# Patient Record
Sex: FEMALE | Race: WHITE | Employment: FULL TIME | URBAN - METROPOLITAN AREA
[De-identification: names, ages, dates, MRNs, and addresses within clinical notes are randomized per-mention and may not be internally consistent; named-entity substitution may affect disease eponyms.]

---

## 2017-03-07 PROBLEM — J30.1 SEASONAL ALLERGIC RHINITIS DUE TO POLLEN: Status: ACTIVE | Noted: 2017-03-07

## 2017-03-07 PROBLEM — J01.01 ACUTE RECURRENT MAXILLARY SINUSITIS: Status: ACTIVE | Noted: 2017-03-07

## 2017-07-19 PROBLEM — J01.01 ACUTE RECURRENT MAXILLARY SINUSITIS: Status: RESOLVED | Noted: 2017-03-07 | Resolved: 2017-07-19

## 2017-07-19 PROBLEM — K82.9 GALL BLADDER DISEASE: Status: ACTIVE | Noted: 2017-07-19

## 2017-10-21 ENCOUNTER — HOSPITAL ENCOUNTER (OUTPATIENT)
Dept: MAMMOGRAPHY | Age: 42
Discharge: HOME | End: 2017-10-21
Attending: INTERNAL MEDICINE
Payer: COMMERCIAL

## 2017-10-21 DIAGNOSIS — Z12.31 VISIT FOR SCREENING MAMMOGRAM: ICD-10-CM

## 2017-10-21 PROCEDURE — 77067 SCR MAMMO BI INCL CAD: CPT

## 2017-10-25 PROBLEM — H65.02 ACUTE SEROUS OTITIS MEDIA OF LEFT EAR: Status: ACTIVE | Noted: 2017-10-25

## 2019-03-12 ENCOUNTER — HOSPITAL ENCOUNTER (OUTPATIENT)
Dept: MAMMOGRAPHY | Age: 44
Discharge: HOME | End: 2019-03-12
Attending: OBSTETRICS & GYNECOLOGY
Payer: COMMERCIAL

## 2019-03-12 DIAGNOSIS — N63.20 LEFT BREAST MASS: ICD-10-CM

## 2019-03-12 PROCEDURE — 77066 DX MAMMO INCL CAD BI: CPT

## 2019-03-12 PROCEDURE — 76642 ULTRASOUND BREAST LIMITED: CPT

## 2019-03-17 NOTE — PROGRESS NOTES
Olivia Crowder I saw that the mammogram and ultrasound didn't show anything worrisome, just dense tissue. So I'll just recheck that area at our follow up visit in a few months.

## 2019-08-27 PROBLEM — Z83.49 FAMILY HISTORY OF THYROID DISEASE: Status: ACTIVE | Noted: 2019-08-27

## 2019-08-27 PROBLEM — L70.8 OTHER ACNE: Status: ACTIVE | Noted: 2019-08-27

## 2020-08-15 ENCOUNTER — HOSPITAL ENCOUNTER (OUTPATIENT)
Dept: MAMMOGRAPHY | Age: 45
Discharge: HOME | End: 2020-08-15
Attending: OBSTETRICS & GYNECOLOGY
Payer: COMMERCIAL

## 2020-08-15 DIAGNOSIS — Z12.31 SCREENING MAMMOGRAM FOR HIGH-RISK PATIENT: ICD-10-CM

## 2020-08-15 PROCEDURE — 77063 BREAST TOMOSYNTHESIS BI: CPT

## 2020-09-25 PROBLEM — J40 BRONCHITIS: Status: ACTIVE | Noted: 2020-09-25

## 2020-09-25 PROBLEM — Z87.09 HISTORY OF PARANASAL SINUS PAIN: Status: ACTIVE | Noted: 2020-09-25

## 2020-09-25 PROBLEM — J01.01 ACUTE RECURRENT MAXILLARY SINUSITIS: Status: ACTIVE | Noted: 2020-09-25

## 2022-03-18 PROBLEM — Z87.09 HISTORY OF PARANASAL SINUS PAIN: Status: ACTIVE | Noted: 2020-09-25

## 2022-03-18 PROBLEM — J30.1 SEASONAL ALLERGIC RHINITIS DUE TO POLLEN: Status: ACTIVE | Noted: 2017-03-07

## 2022-03-19 PROBLEM — L70.8 OTHER ACNE: Status: ACTIVE | Noted: 2019-08-27

## 2022-03-19 PROBLEM — K82.9 GALL BLADDER DISEASE: Status: ACTIVE | Noted: 2017-07-19

## 2022-03-19 PROBLEM — Z83.49 FAMILY HISTORY OF THYROID DISEASE: Status: ACTIVE | Noted: 2019-08-27

## 2022-03-19 PROBLEM — J40 BRONCHITIS: Status: ACTIVE | Noted: 2020-09-25

## 2022-03-20 PROBLEM — J01.01 ACUTE RECURRENT MAXILLARY SINUSITIS: Status: ACTIVE | Noted: 2020-09-25

## 2022-05-02 PROBLEM — Z52.4 KIDNEY DONOR: Status: ACTIVE | Noted: 2022-05-02

## 2022-05-23 ENCOUNTER — TELEPHONE (OUTPATIENT)
Dept: INTERNAL MEDICINE CLINIC | Facility: CLINIC | Age: 47
End: 2022-05-23

## 2022-05-23 DIAGNOSIS — J40 BRONCHITIS: Primary | ICD-10-CM

## 2022-05-23 RX ORDER — PREDNISONE 5 MG/1
TABLET ORAL
Qty: 1 EACH | Refills: 0 | Status: SHIPPED | OUTPATIENT
Start: 2022-05-23 | End: 2022-05-31 | Stop reason: ALTCHOICE

## 2022-05-23 RX ORDER — AZITHROMYCIN 250 MG/1
250 TABLET, FILM COATED ORAL SEE ADMIN INSTRUCTIONS
Qty: 6 TABLET | Refills: 0 | Status: SHIPPED | OUTPATIENT
Start: 2022-05-23 | End: 2022-05-28

## 2022-05-31 ENCOUNTER — OFFICE VISIT (OUTPATIENT)
Dept: INTERNAL MEDICINE CLINIC | Facility: CLINIC | Age: 47
End: 2022-05-31
Payer: COMMERCIAL

## 2022-05-31 VITALS
DIASTOLIC BLOOD PRESSURE: 82 MMHG | HEIGHT: 63 IN | BODY MASS INDEX: 26.58 KG/M2 | WEIGHT: 150 LBS | HEART RATE: 80 BPM | SYSTOLIC BLOOD PRESSURE: 128 MMHG

## 2022-05-31 DIAGNOSIS — F43.21 GRIEF: ICD-10-CM

## 2022-05-31 DIAGNOSIS — I10 HYPERTENSION, UNSPECIFIED TYPE: Primary | ICD-10-CM

## 2022-05-31 PROBLEM — Z52.4 KIDNEY DONOR: Status: RESOLVED | Noted: 2022-05-02 | Resolved: 2022-05-31

## 2022-05-31 PROCEDURE — 99213 OFFICE O/P EST LOW 20 MIN: CPT | Performed by: INTERNAL MEDICINE

## 2022-05-31 ASSESSMENT — PATIENT HEALTH QUESTIONNAIRE - PHQ9
SUM OF ALL RESPONSES TO PHQ9 QUESTIONS 1 & 2: 0
SUM OF ALL RESPONSES TO PHQ QUESTIONS 1-9: 0
1. LITTLE INTEREST OR PLEASURE IN DOING THINGS: 0
SUM OF ALL RESPONSES TO PHQ QUESTIONS 1-9: 0
2. FEELING DOWN, DEPRESSED OR HOPELESS: 0

## 2022-05-31 NOTE — PROGRESS NOTES
HPI    Her blood pressure was too high for kidney donation , diastolic . She brings in a long list of blood pressures that are all high    Past Medical History, Past Surgical History, Family history, Social History, and Medications were all reviewed with the patient today and updated as necessary. Current Outpatient Medications   Medication Sig Dispense Refill    fluticasone (FLONASE) 50 MCG/ACT nasal spray 2 sprays by Nasal route daily      levocetirizine (XYZAL) 5 MG tablet Take 5 mg by mouth daily      montelukast (SINGULAIR) 10 MG tablet Take 10 mg by mouth daily      predniSONE 5 MG (21) TBPK Take as directed 1 each 0     No current facility-administered medications for this visit. Allergies   Allergen Reactions    Drospirenone-Ethinyl Estradiol Other (See Comments)     Dry skin and irritable     Patient Active Problem List   Diagnosis    History of paranasal sinus pain    Seasonal allergic rhinitis due to pollen    Pure hypercholesterolemia    Other acne    Family history of thyroid disease    Bronchitis    Gall bladder disease    Other seasonal allergic rhinitis    Acute recurrent maxillary sinusitis    Sinus congestion    Hypertension    Grief         Review of Systems   Cardiovascular: Negative for palpitations and leg swelling. OBJECTIVE:  /82   Pulse 80   Ht 5' 2.5\" (1.588 m)   Wt 150 lb (68 kg)   BMI 27.00 kg/m²      Physical Exam  Constitutional:       Appearance: Normal appearance. Neurological:      Mental Status: She is alert. Psychiatric:         Mood and Affect: Mood normal.         Behavior: Behavior normal.          Medical problems and test results were reviewed with the patient today. No results found for this or any previous visit (from the past 672 hour(s)). ASSESSMENT and PLAN    Reza Reed was seen today for blood pressure check.     Diagnoses and all orders for this visit:    Hypertension, unspecified type    Grief    She will watch salt, increase aerobic exercise and work in losing weight she gained after her father . Return in about 4 months (around 2022).     Will make a decision about treating her blood pressure on return

## 2022-06-08 ENCOUNTER — TELEPHONE (OUTPATIENT)
Dept: INTERNAL MEDICINE CLINIC | Facility: CLINIC | Age: 47
End: 2022-06-08

## 2022-06-08 DIAGNOSIS — I10 PRIMARY HYPERTENSION: Primary | ICD-10-CM

## 2022-06-08 RX ORDER — TRIAMTERENE AND HYDROCHLOROTHIAZIDE 37.5; 25 MG/1; MG/1
0.5 TABLET ORAL DAILY
Qty: 45 TABLET | Refills: 2 | Status: SHIPPED | OUTPATIENT
Start: 2022-06-08 | End: 2022-10-31 | Stop reason: ALTCHOICE

## 2022-06-08 NOTE — TELEPHONE ENCOUNTER
Pt called to ask to start the blood pressure medication that was discussed at her last appointment, she no longer wants to wait the 4 months. Please call into [facility] on [address].

## 2022-06-08 NOTE — TELEPHONE ENCOUNTER
The patient called to check the status of her medication request.  Please call her back as your early convenience.

## 2022-06-09 NOTE — TELEPHONE ENCOUNTER
Spoke with pt and per Dr. Vielka Price agreeable to take 0.5 tabs daily and continue to monitor home bp. Scheduled pt for 8 week f/u on bp in office.

## 2022-06-24 ENCOUNTER — TELEMEDICINE (OUTPATIENT)
Dept: INTERNAL MEDICINE CLINIC | Facility: CLINIC | Age: 47
End: 2022-06-24
Payer: COMMERCIAL

## 2022-06-24 DIAGNOSIS — R05.9 COUGH: Primary | ICD-10-CM

## 2022-06-24 PROCEDURE — 99213 OFFICE O/P EST LOW 20 MIN: CPT | Performed by: PHYSICIAN ASSISTANT

## 2022-06-24 RX ORDER — PREDNISONE 5 MG/1
TABLET ORAL
Qty: 1 EACH | Refills: 0 | Status: SHIPPED | OUTPATIENT
Start: 2022-06-24 | End: 2022-08-19

## 2022-06-24 RX ORDER — AZITHROMYCIN 250 MG/1
250 TABLET, FILM COATED ORAL SEE ADMIN INSTRUCTIONS
Qty: 6 TABLET | Refills: 0 | Status: SHIPPED | OUTPATIENT
Start: 2022-06-24 | End: 2022-06-29

## 2022-06-24 RX ORDER — BENZONATATE 200 MG/1
200 CAPSULE ORAL 3 TIMES DAILY PRN
Qty: 30 CAPSULE | Refills: 0 | Status: SHIPPED | OUTPATIENT
Start: 2022-06-24 | End: 2022-07-01

## 2022-06-24 ASSESSMENT — ENCOUNTER SYMPTOMS
SINUS PAIN: 1
WHEEZING: 0
SHORTNESS OF BREATH: 0
COUGH: 1
CONSTIPATION: 0
VOMITING: 0
SINUS PRESSURE: 1
EYE PAIN: 0
VOICE CHANGE: 0
SORE THROAT: 0
CHEST TIGHTNESS: 0
NAUSEA: 0
COLOR CHANGE: 0
ABDOMINAL PAIN: 0
DIARRHEA: 0

## 2022-06-24 NOTE — PROGRESS NOTES
PROGRESS NOTE    Meme Pinzon is a 51 y.o. female seen for a follow up visit regarding   Chief Complaint   Patient presents with    Cough    Congestion        HPI  Cough  This is a new problem. The current episode started in the past 7 days. The problem has been gradually worsening. The cough is non-productive. Pertinent negatives include no chest pain, ear pain, fever, headaches, rash, sore throat, shortness of breath or wheezing. The symptoms are aggravated by pollens. She has tried nothing for the symptoms. Past Medical History, Past Surgical History, Family history, Social History, and Medications were all reviewed with the patient today and updated as necessary. Current Outpatient Medications   Medication Sig Dispense Refill    azithromycin (ZITHROMAX) 250 MG tablet Take 1 tablet by mouth See Admin Instructions for 5 days 500mg on day 1 followed by 250mg on days 2 - 5 6 tablet 0    benzonatate (TESSALON) 200 MG capsule Take 1 capsule by mouth 3 times daily as needed for Cough 30 capsule 0    predniSONE 5 MG (21) TBPK See administration instruction per 5 mg dose pac 1 each 0    triamterene-hydroCHLOROthiazide (MAXZIDE-25) 37.5-25 MG per tablet Take 0.5 tablets by mouth daily 45 tablet 2    fluticasone (FLONASE) 50 MCG/ACT nasal spray 2 sprays by Nasal route daily      levocetirizine (XYZAL) 5 MG tablet Take 5 mg by mouth daily      montelukast (SINGULAIR) 10 MG tablet Take 10 mg by mouth daily       No current facility-administered medications for this visit. Allergies   Allergen Reactions    Drospirenone-Ethinyl Estradiol Other (See Comments)     Dry skin and irritable         ROS  Review of Systems   Constitutional: Negative for fatigue, fever and unexpected weight change. HENT: Positive for congestion, sinus pressure and sinus pain. Negative for ear pain, hearing loss, sore throat, tinnitus and voice change. Eyes: Negative for pain and visual disturbance.    Respiratory: Positive for cough. Negative for chest tightness, shortness of breath and wheezing. Cardiovascular: Negative for chest pain, palpitations and leg swelling. Gastrointestinal: Negative for abdominal pain, constipation, diarrhea, nausea and vomiting. Genitourinary: Negative for dysuria, frequency, hematuria and urgency. Musculoskeletal: Negative for arthralgias, gait problem, joint swelling and neck pain. Skin: Negative for color change and rash. Neurological: Negative for dizziness, syncope, numbness and headaches. Hematological: Negative for adenopathy. Psychiatric/Behavioral: Negative for decreased concentration, hallucinations, sleep disturbance and suicidal ideas. The patient is not nervous/anxious. OBJECTIVE:  There were no vitals taken for this visit. PHYSICAL EXAM  Physical Exam  Constitutional:       Appearance: Normal appearance. HENT:      Head: Normocephalic and atraumatic. Nose: Nose normal.   Eyes:      Extraocular Movements: Extraocular movements intact. Pulmonary:      Effort: Pulmonary effort is normal.   Musculoskeletal:         General: Normal range of motion. Cervical back: Normal range of motion. Skin:     Findings: No erythema or rash. Neurological:      General: No focal deficit present. Mental Status: She is alert and oriented to person, place, and time. Psychiatric:         Mood and Affect: Mood normal.         Medical problems and test results were reviewed with the patient today. ASSESSMENT and PLAN  Steven Waldrop was seen today for cough and congestion. Diagnoses and all orders for this visit:    Cough  -     azithromycin (ZITHROMAX) 250 MG tablet; Take 1 tablet by mouth See Admin Instructions for 5 days 500mg on day 1 followed by 250mg on days 2 - 5  -     benzonatate (TESSALON) 200 MG capsule;  Take 1 capsule by mouth 3 times daily as needed for Cough  -     predniSONE 5 MG (21) TBPK; See administration instruction per 5 mg dose pac          Return for Please keep previous appt as scheduled. 6/24/2022        I was in office while conducting this encounter. Consent:  She and/or her healthcare decision maker is aware that this patient-initiated Telehealth encounter is a billable service, with coverage as determined by her insurance carrier. She is aware that she may receive a bill and has provided verbal consent to proceed: Yes    This virtual visit was conducted doxy. me with audio and visual components

## 2022-08-18 PROBLEM — Z52.4 KIDNEY DONOR: Status: ACTIVE | Noted: 2022-05-02

## 2022-08-18 PROBLEM — J30.1 NON-SEASONAL ALLERGIC RHINITIS DUE TO POLLEN: Status: ACTIVE | Noted: 2017-03-07

## 2022-08-19 ENCOUNTER — OFFICE VISIT (OUTPATIENT)
Dept: INTERNAL MEDICINE CLINIC | Facility: CLINIC | Age: 47
End: 2022-08-19
Payer: COMMERCIAL

## 2022-08-19 VITALS
DIASTOLIC BLOOD PRESSURE: 82 MMHG | HEIGHT: 63 IN | WEIGHT: 152 LBS | BODY MASS INDEX: 26.93 KG/M2 | SYSTOLIC BLOOD PRESSURE: 124 MMHG

## 2022-08-19 DIAGNOSIS — J30.1 NON-SEASONAL ALLERGIC RHINITIS DUE TO POLLEN: ICD-10-CM

## 2022-08-19 DIAGNOSIS — I10 PRIMARY HYPERTENSION: Primary | ICD-10-CM

## 2022-08-19 DIAGNOSIS — E78.00 PURE HYPERCHOLESTEROLEMIA, UNSPECIFIED: ICD-10-CM

## 2022-08-19 PROCEDURE — 99213 OFFICE O/P EST LOW 20 MIN: CPT | Performed by: INTERNAL MEDICINE

## 2022-08-19 ASSESSMENT — PATIENT HEALTH QUESTIONNAIRE - PHQ9
SUM OF ALL RESPONSES TO PHQ QUESTIONS 1-9: 0
1. LITTLE INTEREST OR PLEASURE IN DOING THINGS: 0
SUM OF ALL RESPONSES TO PHQ QUESTIONS 1-9: 0
SUM OF ALL RESPONSES TO PHQ QUESTIONS 1-9: 0
SUM OF ALL RESPONSES TO PHQ9 QUESTIONS 1 & 2: 0
SUM OF ALL RESPONSES TO PHQ QUESTIONS 1-9: 0
2. FEELING DOWN, DEPRESSED OR HOPELESS: 0

## 2022-08-19 NOTE — PROGRESS NOTES
She could not donate her kidney and was found to have hypertension. Her sister donated her kidney. She has been on current medicine for 2 months now      Past Medical History, Past Surgical History, Family history, Social History, and Medications were all reviewed with the patient today and updated as necessary. Current Outpatient Medications   Medication Sig Dispense Refill    triamterene-hydroCHLOROthiazide (MAXZIDE-25) 37.5-25 MG per tablet Take 0.5 tablets by mouth daily 45 tablet 2    fluticasone (FLONASE) 50 MCG/ACT nasal spray 2 sprays by Nasal route daily as needed      levocetirizine (XYZAL) 5 MG tablet Take 5 mg by mouth daily      montelukast (SINGULAIR) 10 MG tablet Take 10 mg by mouth daily       No current facility-administered medications for this visit. Allergies   Allergen Reactions    Drospirenone-Ethinyl Estradiol Other (See Comments)     Dry skin and irritable     Patient Active Problem List   Diagnosis    History of paranasal sinus pain    Non-seasonal allergic rhinitis due to pollen    Pure hypercholesterolemia, unspecified    Other acne    Family history of thyroid disease    Bronchitis    Gall bladder disease    Other seasonal allergic rhinitis    Acute recurrent maxillary sinusitis    Sinus congestion    Kidney donor    Hypertension    Grief         Review of Systems   Constitutional:  Negative for appetite change. OBJECTIVE:  /82   Ht 5' 2.5\" (1.588 m)   Wt 152 lb (68.9 kg)   BMI 27.36 kg/m²      Physical Exam     Medical problems and test results were reviewed with the patient today. No results found for this or any previous visit (from the past 672 hour(s)). ASSESSMENT and PLAN    1. Primary hypertension  -     Basic Metabolic Panel; Future  2. Pure hypercholesterolemia, unspecified  3.  Non-seasonal allergic rhinitis due to pollen       BP good at home and she has been monitoring, she will bring in her cuff next time    No follow-ups on file.

## 2022-11-07 DIAGNOSIS — J30.2 OTHER SEASONAL ALLERGIC RHINITIS: Primary | ICD-10-CM

## 2022-11-07 RX ORDER — MONTELUKAST SODIUM 10 MG/1
10 TABLET ORAL DAILY
Qty: 90 TABLET | Refills: 5 | Status: SHIPPED | OUTPATIENT
Start: 2022-11-07 | End: 2023-02-05

## 2023-01-11 PROBLEM — R92.30 BREAST DENSITY: Status: ACTIVE | Noted: 2023-01-11

## 2023-01-11 PROBLEM — R92.2 BREAST DENSITY: Status: ACTIVE | Noted: 2023-01-11

## 2023-01-12 ENCOUNTER — OFFICE VISIT (OUTPATIENT)
Dept: OBGYN CLINIC | Age: 48
End: 2023-01-12
Payer: COMMERCIAL

## 2023-01-12 VITALS
BODY MASS INDEX: 27.46 KG/M2 | DIASTOLIC BLOOD PRESSURE: 84 MMHG | SYSTOLIC BLOOD PRESSURE: 122 MMHG | HEIGHT: 63 IN | WEIGHT: 155 LBS

## 2023-01-12 DIAGNOSIS — Z12.31 SCREENING MAMMOGRAM FOR BREAST CANCER: ICD-10-CM

## 2023-01-12 DIAGNOSIS — R92.2 BREAST DENSITY: ICD-10-CM

## 2023-01-12 DIAGNOSIS — Z12.4 PAP SMEAR FOR CERVICAL CANCER SCREENING: ICD-10-CM

## 2023-01-12 DIAGNOSIS — Z01.419 WELL WOMAN EXAM WITH ROUTINE GYNECOLOGICAL EXAM: ICD-10-CM

## 2023-01-12 DIAGNOSIS — Z12.31 ENCOUNTER FOR SCREENING MAMMOGRAM FOR MALIGNANT NEOPLASM OF BREAST: ICD-10-CM

## 2023-01-12 DIAGNOSIS — Z11.51 SCREENING FOR HUMAN PAPILLOMAVIRUS (HPV): ICD-10-CM

## 2023-01-12 DIAGNOSIS — Z01.419 WELL WOMAN EXAM WITH ROUTINE GYNECOLOGICAL EXAM: Primary | ICD-10-CM

## 2023-01-12 PROCEDURE — 99396 PREV VISIT EST AGE 40-64: CPT | Performed by: OBSTETRICS & GYNECOLOGY

## 2023-01-12 PROCEDURE — 3079F DIAST BP 80-89 MM HG: CPT | Performed by: OBSTETRICS & GYNECOLOGY

## 2023-01-12 PROCEDURE — 3074F SYST BP LT 130 MM HG: CPT | Performed by: OBSTETRICS & GYNECOLOGY

## 2023-01-12 RX ORDER — CALCIUM CARBONATE 500(1250)
500 TABLET ORAL DAILY
COMMUNITY

## 2023-01-12 RX ORDER — OMEPRAZOLE 10 MG/1
10 CAPSULE, DELAYED RELEASE ORAL DAILY
COMMUNITY

## 2023-01-12 NOTE — PROGRESS NOTES
CC:  Annual        HPI:  49 y.o.  G0  presents today for a routine gynecological examination. Patient's last menstrual period was 2023 (approximate). Prior pt of Dr Delia Esparza. PCP: Dr Margie Ayoub     Contraception:  abstinence      Single, lives w/ her sister and mom  In [employer]       Hx irreg periods in the remote past, but now very regular     Menses:  Q 28  Days x 3 days, mod  Flow, no intermenstrual VB/spotting, no dysmenorrhea         HRT:  none     Vasomotor sxs: some night sweats --mild   Vaginal dryness: none         CHTN  PCP monitoring closely; no meds          GYN hx:  As per HPI    Last Pap smear result: 21-- Neg Cytology   Abnormal Pap history:  no       Last MGM 10/18/21--  BiRads 2, BD4   Last Colonoscopy:   did Cologard -- states wants Colonoscopy next time            OB History    Para Term  AB Living   0 0 0 0 0 0   SAB IAB Ectopic Molar Multiple Live Births   0 0 0 0 0 0         PMH:  Past Medical History:   Diagnosis Date    Anal fissure 2013    Breast density     Hypertension 2022       PSH:  Past Surgical History:   Procedure Laterality Date    ORTHOPEDIC SURGERY  3days old    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 She has never used smokeless tobacco. She reports that she does not drink alcohol and does not use drugs. .    Family Hx:  Family History   Problem Relation Age of Onset    Other Paternal Grandmother         brain aneurysm    Heart Attack Paternal Grandfather     Heart Disease Mother         CHF    Cancer Maternal Aunt     Other Father         COVID    Neuropathy Father     Celiac Disease Sister     Diabetes Brother          Review of Systems   Constitutional: Negative for chills, fever and weight loss.    HENT: Negative for hearing loss. Eyes: Negative for blurred vision and double vision. Respiratory: Negative for cough, hemoptysis and shortness of breath. Cardiovascular: Negative for chest pain, palpitations and orthopnea. Gastrointestinal: Negative for abdominal pain, blood in stool, constipation, diarrhea, nausea and vomiting. Genitourinary: Negative for dysuria, frequency, hematuria and urgency. Musculoskeletal: Negative for falls, joint pain and myalgias. Skin: Negative for itching and rash. Neurological: Negative for headaches. Endo/Heme/Allergies: Does not bruise/bleed easily. Psychiatric/Behavioral: Negative for depression and suicidal ideas. The patient is not nervous/anxious. All other systems reviewed and are negative. Physical Exam      /84   Ht 5' 2.5\" (1.588 m)   Wt 155 lb (70.3 kg)   LMP 01/02/2023 (Approximate)   BMI 27.90 kg/m²     Constitutional: She appears well-developed and well-nourished. No distress. HENT:    Head: Normocephalic and atraumatic. Neck: Normal range of motion. No thyromegaly present. Cardiovascular: Normal rate, regular rhythm and normal heart sounds. Exam reveals no gallop and no friction rub. No murmur heard. Pulmonary/Chest: Effort normal and breath sounds normal. No respiratory distress. She has no wheezes. She has no rales. Abdominal: Soft. Bowel sounds are normal. She exhibits no distension and no mass. There is no tenderness. There is no rebound and no guarding. Skin: She is not diaphoretic. Psychiatric: She has a normal mood and affect.  Her behavior is normal. Thought content normal. .    Pelvic:   External genitalia wnl, no lesions, rashes  Clitoris and urethra midline  Vagina pink, moist, well rugated   Cervix without lesion/masses, DC wnl, no CMT   Uterus normal in size and contour, no masses, NTTP  Adnexa without masses, NTTP    Breasts:   Symmetric, no lesions, masses, rashes, no abnl nipple Dc       Counseling:  Discussed General Recommendations (pts 40-63yo):  -Routine Pap  (unless cervix removed for benign reasons and not hx high grade dysplasia [ie ILA 2-3])  -Routine STD testing for high risk individuals   -Routine Mammogram   -lipid profile every 5 yrs  -colonoscopy (>/=44yo)  -Tdap once and then Td every 10yrs  -TSH every 5 years (>50)  -Zoster Vaccine (age 61), single dose ZVL or (age 48) 2 doses RZV  -Influenza Vaccine, annually  -Healthy eating/exercise   -HPV vaccine newest recs (10yo-44yo)           ASSESSMENT/PLAN:   49 y.o. G0 for annual GYN exam:     - Cotesting today    -3D Mammogram   referral   -safe sexual practices    -FU w/ PCP for all non-GYN medical issues and regular screening for lipids,TSH, diabetes           Bebe Patel MD

## 2023-01-20 LAB
CYTOLOGIST CVX/VAG CYTO: NORMAL
CYTOLOGY CVX/VAG DOC THIN PREP: NORMAL
HPV APTIMA: NEGATIVE
HPV GENOTYPE REFLEX: NORMAL
Lab: NORMAL
Lab: NORMAL
PATH REPORT.FINAL DX SPEC: NORMAL
STAT OF ADQ CVX/VAG CYTO-IMP: NORMAL

## 2023-02-08 ENCOUNTER — HOSPITAL ENCOUNTER (OUTPATIENT)
Dept: MAMMOGRAPHY | Age: 48
Discharge: HOME | End: 2023-02-11
Payer: COMMERCIAL

## 2023-02-08 DIAGNOSIS — Z12.31 SCREENING MAMMOGRAM FOR BREAST CANCER: ICD-10-CM

## 2023-02-08 PROCEDURE — 77067 SCR MAMMO BI INCL CAD: CPT

## 2023-03-07 ENCOUNTER — HOSPITAL ENCOUNTER (OUTPATIENT)
Dept: MAMMOGRAPHY | Age: 48
Discharge: HOME | End: 2023-03-10
Payer: COMMERCIAL

## 2023-03-07 DIAGNOSIS — R92.8 ABNORMAL SCREENING MAMMOGRAM: ICD-10-CM

## 2023-03-07 PROCEDURE — 77065 DX MAMMO INCL CAD UNI: CPT

## 2023-03-07 PROCEDURE — 76642 ULTRASOUND BREAST LIMITED: CPT

## 2023-03-08 NOTE — RESULT ENCOUNTER NOTE
IMPRESSION:  No suspicious right breast finding on the additional evaluation.     Recommend screening mammography in February 2024. A reminder letter will be  scheduled.   This was reported to the patient at the time of interpretation.     BI-RADS Assessment Category 2: Benign finding

## 2024-02-12 ENCOUNTER — TRANSCRIBE ORDERS (OUTPATIENT)
Dept: SCHEDULING | Age: 49
End: 2024-02-12

## 2024-02-12 DIAGNOSIS — Z12.31 SCREENING MAMMOGRAM FOR HIGH-RISK PATIENT: Primary | ICD-10-CM

## 2024-04-19 ENCOUNTER — HOSPITAL ENCOUNTER (OUTPATIENT)
Dept: MAMMOGRAPHY | Age: 49
Discharge: HOME | End: 2024-04-19
Payer: COMMERCIAL

## 2024-04-19 VITALS — WEIGHT: 165 LBS | BODY MASS INDEX: 30.36 KG/M2 | HEIGHT: 62 IN

## 2024-04-19 DIAGNOSIS — Z12.31 SCREENING MAMMOGRAM FOR HIGH-RISK PATIENT: ICD-10-CM

## 2024-04-19 PROCEDURE — 77063 BREAST TOMOSYNTHESIS BI: CPT

## 2025-06-02 ENCOUNTER — TRANSCRIBE ORDERS (OUTPATIENT)
Dept: SCHEDULING | Age: 50
End: 2025-06-02

## 2025-06-02 ENCOUNTER — HOSPITAL ENCOUNTER (OUTPATIENT)
Dept: MAMMOGRAPHY | Age: 50
Discharge: HOME | End: 2025-06-05
Payer: COMMERCIAL

## 2025-06-02 DIAGNOSIS — Z12.31 VISIT FOR SCREENING MAMMOGRAM: Primary | ICD-10-CM

## 2025-06-02 DIAGNOSIS — Z12.31 VISIT FOR SCREENING MAMMOGRAM: ICD-10-CM

## 2025-06-02 PROCEDURE — 77063 BREAST TOMOSYNTHESIS BI: CPT
